# Patient Record
Sex: MALE | Race: WHITE | NOT HISPANIC OR LATINO | URBAN - METROPOLITAN AREA
[De-identification: names, ages, dates, MRNs, and addresses within clinical notes are randomized per-mention and may not be internally consistent; named-entity substitution may affect disease eponyms.]

---

## 2018-01-31 ENCOUNTER — HOSPITAL ENCOUNTER (OUTPATIENT)
Dept: PHYSICAL THERAPY | Facility: HOSPITAL | Age: 38
Setting detail: THERAPIES SERIES
Discharge: HOME | End: 2018-01-31

## 2018-01-31 DIAGNOSIS — M43.16 SPONDYLOLISTHESIS OF LUMBAR REGION: Primary | ICD-10-CM

## 2018-01-31 PROCEDURE — 97162 PT EVAL MOD COMPLEX 30 MIN: CPT

## 2018-01-31 PROCEDURE — 97110 THERAPEUTIC EXERCISES: CPT

## 2018-02-06 ENCOUNTER — HOSPITAL ENCOUNTER (OUTPATIENT)
Dept: PHYSICAL THERAPY | Facility: HOSPITAL | Age: 38
Setting detail: THERAPIES SERIES
Discharge: HOME | End: 2018-02-06

## 2018-02-06 DIAGNOSIS — M43.16 SPONDYLOLISTHESIS OF LUMBAR REGION: Primary | ICD-10-CM

## 2018-02-06 PROCEDURE — 97110 THERAPEUTIC EXERCISES: CPT

## 2018-02-06 NOTE — THERAPY TREATMENT NOTE
Outpatient Physical Therapy Ortho Treatment Note   Mei Hoang     Patient Name: Ray Gagnon  : 1980  MRN: [id number]  Today's Date: 2018      Visit Date: 2018    Visit Dx:    ICD-10-CM ICD-9-CM   1. Spondylolisthesis of lumbar region M43.16 738.4       There is no problem list on file for this patient.       No past medical history on file.     No past surgical history on file.                          PT Assessment/Plan       18 1100       PT Assessment    Assessment Comments Progressed patient with core and trunk strengthening today with minimal complaints of pain and discomfort in his low back. D/C prone hip extensions as he reported increases in low back discomfort.  -AS     PT Plan    PT Plan Comments Continue with current treatment plan.  -AS       User Key  (r) = Recorded By, (t) = Taken By, (c) = Cosigned By    Initials Name Provider Type    AS Nahun Khan, PT Physical Therapist                Modalities       18 1100          Moist Heat    MH Applied Yes  -AS      Location Lumbar  -AS      Rx Minutes 15 mins  -AS      MH Prior to Rx Yes  -AS      ELECTRICAL STIMULATION    Attended/Unattended Unattended  -AS      Stimulation Type IFC  -AS      Location/Electrode Placement/Other Lumbar  -AS      Rx Minutes 15 mins  -AS        User Key  (r) = Recorded By, (t) = Taken By, (c) = Cosigned By    Initials Name Provider Type    AS Nahun Khan, PT Physical Therapist                Exercises       18 1100          Subjective Comments    Subjective Comments Patient states that his low back is feeling better this morning.  -AS      Exercise 1    Exercise Name 1 Blayne. HS Stretch  -AS      Reps 1 10  -AS      Time (Seconds) 1 10 sec hold  -AS      Exercise 2    Exercise Name 2 Blayne. Piriformis Stretch  -AS      Reps 2 10  -AS      Time (Seconds) 2 10 sec hold  -AS      Exercise 3    Exercise Name 3 PPT  -AS      Reps 3 25  -AS      Time (Seconds) 3 5 sec hold  -AS       Exercise 4    Exercise Name 4 Sidelying Hip ABD  -AS      Reps 4 30  -AS      Exercise 5    Exercise Name 5 --  -AS      Reps 5 --  -AS      Exercise 6    Exercise Name 6 Bridge vs Band  -AS      Reps 6 30  -AS      Additional Comments Silver  -AS      Exercise 7    Exercise Name 7 Sidelying Clams  -AS      Reps 7 30  -AS      Additional Comments Silver  -AS      Exercise 8    Exercise Name 8 Seated Hip IR/ER  -AS      Reps 8 20  -AS      Additional Comments Silver  -AS      Exercise 9    Exercise Name 9 Quadruped Alt. UE/LE  -AS      Reps 9 15  -AS        User Key  (r) = Recorded By, (t) = Taken By, (c) = Cosigned By    Initials Name Provider Type    AS Nahun Khan, PT Physical Therapist                                            Time Calculation:   Start Time: 0928  Stop Time: 1021  Time Calculation (min): 53 min    Therapy Charges for Today     Code Description Service Date Service Provider Modifiers Qty    [id number]  PT THER PROC EA 15 MIN 2/6/2018 Nahun Khan, PT GP 2                    Nahun Khan, PT  2/6/2018

## 2018-02-08 ENCOUNTER — HOSPITAL ENCOUNTER (OUTPATIENT)
Dept: PHYSICAL THERAPY | Facility: HOSPITAL | Age: 38
Setting detail: THERAPIES SERIES
Discharge: HOME | End: 2018-02-08

## 2018-02-08 DIAGNOSIS — M43.16 SPONDYLOLISTHESIS OF LUMBAR REGION: Primary | ICD-10-CM

## 2018-02-08 PROCEDURE — 97110 THERAPEUTIC EXERCISES: CPT

## 2018-02-08 NOTE — THERAPY TREATMENT NOTE
Outpatient Physical Therapy Ortho Treatment Note   Mei Hoang     Patient Name: Ray Gagnon  : 1980  MRN: [id number]  Today's Date: 2018      Visit Date: 2018    Visit Dx:    ICD-10-CM ICD-9-CM   1. Spondylolisthesis of lumbar region M43.16 738.4       There is no problem list on file for this patient.       No past medical history on file.     No past surgical history on file.                          PT Assessment/Plan       18 0900       PT Assessment    Assessment Comments Continued to progress patient with core, trunk, and bilateral hip strengthening today without complaints of increased pain or discomfort.  -AS     PT Plan    PT Plan Comments Continue with current treatment plan.  -AS       User Key  (r) = Recorded By, (t) = Taken By, (c) = Cosigned By    Initials Name Provider Type    AS Nahun Khan, PT Physical Therapist                Modalities       18 0900          Moist Heat    MH Applied Yes  -AS      Location Lumbar  -AS      Rx Minutes 15 mins  -AS      MH Prior to Rx Yes  -AS      ELECTRICAL STIMULATION    Attended/Unattended Unattended  -AS      Stimulation Type IFC  -AS      Location/Electrode Placement/Other Lumbar  -AS      Rx Minutes 15 mins  -AS        User Key  (r) = Recorded By, (t) = Taken By, (c) = Cosigned By    Initials Name Provider Type    AS Nahun Khan, PT Physical Therapist                Exercises       18 09          Subjective Comments    Subjective Comments Patient states that he was sore after his last treatment session. He states that he is feeling better this morning.  -AS      Exercise 1    Exercise Name 1 Blayne. HS Stretch  -AS      Reps 1 10  -AS      Time (Seconds) 1 10 sec hold  -AS      Exercise 2    Exercise Name 2 Blayne. Piriformis Stretch  -AS      Reps 2 10  -AS      Time (Seconds) 2 10 sec hold  -AS      Exercise 3    Exercise Name 3 PPT  -AS      Reps 3 25  -AS      Time (Seconds) 3 5 sec hold  -AS       Exercise 4    Exercise Name 4 Sidelying Hip ABD  -AS      Reps 4 Other   40  -AS      Exercise 6    Exercise Name 6 Bridge vs Band  -AS      Reps 6 Other   40  -AS      Additional Comments Silver  -AS      Exercise 7    Exercise Name 7 Sidelying Clams  -AS      Reps 7 Other   40  -AS      Additional Comments Silver  -AS      Exercise 8    Exercise Name 8 Seated Hip IR/ER  -AS      Reps 8 25  -AS      Additional Comments Silver  -AS      Exercise 9    Exercise Name 9 Quadruped Alt. UE/LE  -AS      Reps 9 20  -AS      Exercise 10    Exercise Name 10 Dead Bug  -AS      Reps 10 15  -AS        User Key  (r) = Recorded By, (t) = Taken By, (c) = Cosigned By    Initials Name Provider Type    AS Nahun Khan, PT Physical Therapist                                            Time Calculation:   Start Time: 0937  Stop Time: 1034  Time Calculation (min): 57 min    Therapy Charges for Today     Code Description Service Date Service Provider Modifiers Qty    [id number] HC PT THER PROC EA 15 MIN 2/8/2018 Nahun Khan, PT GP 2                    Nahun Khan, PT  2/8/2018

## 2018-02-13 ENCOUNTER — HOSPITAL ENCOUNTER (OUTPATIENT)
Dept: PHYSICAL THERAPY | Facility: HOSPITAL | Age: 38
Setting detail: THERAPIES SERIES
Discharge: HOME | End: 2018-02-13

## 2018-02-13 DIAGNOSIS — M43.16 SPONDYLOLISTHESIS OF LUMBAR REGION: Primary | ICD-10-CM

## 2018-02-13 PROCEDURE — 97110 THERAPEUTIC EXERCISES: CPT

## 2018-02-13 NOTE — THERAPY TREATMENT NOTE
Outpatient Physical Therapy Ortho Treatment Note  TE [facility]     Patient Name: Ray Gagnon  : 1980  MRN: [id number]  Today's Date: 2018      Visit Date: 2018    Visit Dx:    ICD-10-CM ICD-9-CM   1. Spondylolisthesis of lumbar region M43.16 738.4       There is no problem list on file for this patient.       No past medical history on file.     No past surgical history on file.                          PT Assessment/Plan       18 09       PT Assessment    Assessment Comments Patient continues to report discomfort with soreness in his low back. Continued with strengthening exercises today as tolerated by the patient.  -AS     PT Plan    PT Plan Comments Continue with current treatment plan.  -AS       User Key  (r) = Recorded By, (t) = Taken By, (c) = Cosigned By    Initials Name Provider Type    AS Nahun Khan, PT Physical Therapist                Modalities       18 09          Moist Heat    MH Applied Yes  -AS      Location Lumbar  -AS      Rx Minutes 15 mins  -AS      MH Prior to Rx Yes  -AS      ELECTRICAL STIMULATION    Attended/Unattended Unattended  -AS      Stimulation Type IFC  -AS      Location/Electrode Placement/Other Lumbar  -AS      Rx Minutes 15 mins  -AS        User Key  (r) = Recorded By, (t) = Taken By, (c) = Cosigned By    Initials Name Provider Type    AS Nahun Khan, PT Physical Therapist                Exercises       18 09          Subjective Comments    Subjective Comments Patient states that his back feels about the same. He states that he pretty much gets sore all the time.  -AS      Exercise 1    Exercise Name 1 Blayne. HS Stretch  -AS      Reps 1 10  -AS      Time (Seconds) 1 10 sec hold  -AS      Exercise 2    Exercise Name 2 Blayne. Piriformis Stretch  -AS      Reps 2 10  -AS      Time (Seconds) 2 10 sec hold  -AS      Exercise 3    Exercise Name 3 PPT  -AS      Reps 3 25  -AS      Time (Seconds) 3 5 sec hold  -AS       Exercise 4    Exercise Name 4 Sidelying Hip ABD  -AS      Reps 4 Other   40  -AS      Exercise 6    Exercise Name 6 Bridge vs Band  -AS      Reps 6 25  -AS      Additional Comments Gold  -AS      Exercise 7    Exercise Name 7 Sidelying Clams  -AS      Reps 7 25  -AS      Additional Comments Gold  -AS      Exercise 8    Exercise Name 8 Seated Hip IR/ER  -AS      Reps 8 20  -AS      Additional Comments Gold  -AS      Exercise 9    Exercise Name 9 Quadruped Alt. UE/LE  -AS      Reps 9 20  -AS      Exercise 10    Exercise Name 10 Dead Bug  -AS      Reps 10 20  -AS        User Key  (r) = Recorded By, (t) = Taken By, (c) = Cosigned By    Initials Name Provider Type    AS Nahun Khan, PT Physical Therapist                                            Time Calculation:   Start Time: 0933  Stop Time: 1024  Time Calculation (min): 51 min    Therapy Charges for Today     Code Description Service Date Service Provider Modifiers Qty    [id number]  PT THER PROC EA 15 MIN 2/13/2018 Nahun Khan, PT GP 2                    Nahun Khan, PT  2/13/2018

## 2018-02-15 ENCOUNTER — HOSPITAL ENCOUNTER (OUTPATIENT)
Dept: PHYSICAL THERAPY | Facility: HOSPITAL | Age: 38
Setting detail: THERAPIES SERIES
Discharge: HOME | End: 2018-02-15

## 2018-02-15 DIAGNOSIS — M43.16 SPONDYLOLISTHESIS OF LUMBAR REGION: Primary | ICD-10-CM

## 2018-02-15 PROCEDURE — 97110 THERAPEUTIC EXERCISES: CPT

## 2018-02-15 NOTE — THERAPY TREATMENT NOTE
Outpatient Physical Therapy Ortho Treatment Note   Mei Hoang     Patient Name: Ray Gagnon  : 1980  MRN: [id number]  Today's Date: 2/15/2018      Visit Date: 02/15/2018    Visit Dx:    ICD-10-CM ICD-9-CM   1. Spondylolisthesis of lumbar region M43.16 738.4       There is no problem list on file for this patient.       No past medical history on file.     No past surgical history on file.                          PT Assessment/Plan       02/15/18 1100       PT Assessment    Assessment Comments Decreased intensity of patient's treatment session today as he is reporting increased soreness and discomfort.  -AS     PT Plan    PT Plan Comments Continue with current treatment plan.  -AS       User Key  (r) = Recorded By, (t) = Taken By, (c) = Cosigned By    Initials Name Provider Type    AS Nahun Khan, PT Physical Therapist                Modalities       02/15/18 1100          Moist Heat    MH Applied Yes  -AS      Location Lumbar  -AS      Rx Minutes 15 mins  -AS      MH Prior to Rx Yes  -AS      ELECTRICAL STIMULATION    Attended/Unattended Unattended  -AS      Stimulation Type IFC  -AS      Location/Electrode Placement/Other Lumbar  -AS      Rx Minutes 15 mins  -AS        User Key  (r) = Recorded By, (t) = Taken By, (c) = Cosigned By    Initials Name Provider Type    AS Nahun Khan, PT Physical Therapist                Exercises       02/15/18 1100          Subjective Comments    Subjective Comments Patient states that he is very sore this morning is his low back.  -AS      Exercise 1    Exercise Name 1 Blayne. HS Stretch  -AS      Reps 1 10  -AS      Time (Seconds) 1 10 sec hold  -AS      Exercise 2    Exercise Name 2 Blayne. Piriformis Stretch  -AS      Reps 2 10  -AS      Time (Seconds) 2 10 sec hold  -AS      Exercise 3    Exercise Name 3 PPT  -AS      Reps 3 25  -AS      Time (Seconds) 3 5 sec hold  -AS      Exercise 4    Exercise Name 4 Sidelying Hip ABD  -AS      Reps 4 Other   40   -AS      Exercise 6    Exercise Name 6 Bridge vs Band  -AS      Reps 6 30  -AS      Additional Comments Silver  -AS      Exercise 7    Exercise Name 7 Sidelying Clams  -AS      Reps 7 30  -AS      Additional Comments Silver  -AS      Exercise 8    Exercise Name 8 Seated Hip IR/ER  -AS      Reps 8 20  -AS      Additional Comments Silver  -AS      Exercise 9    Exercise Name 9 Quadruped Alt. UE/LE  -AS      Reps 9 20  -AS      Exercise 10    Exercise Name 10 Dead Bug  -AS      Reps 10 20  -AS        User Key  (r) = Recorded By, (t) = Taken By, (c) = Cosigned By    Initials Name Provider Type    AS Nahun Khan, PT Physical Therapist                                            Time Calculation:   Start Time: 0928  Stop Time: 1017  Time Calculation (min): 49 min    Therapy Charges for Today     Code Description Service Date Service Provider Modifiers Qty    [id number]  PT THER PROC EA 15 MIN 2/15/2018 Nahun Khan, PT GP 2                    Nahun Khan, PT  2/15/2018

## 2025-08-17 ENCOUNTER — APPOINTMENT (OUTPATIENT)
Dept: CT IMAGING | Facility: HOSPITAL | Age: 45
End: 2025-08-17
Payer: COMMERCIAL

## 2025-08-17 ENCOUNTER — HOSPITAL ENCOUNTER (EMERGENCY)
Facility: HOSPITAL | Age: 45
Discharge: HOME | End: 2025-08-17
Attending: STUDENT IN AN ORGANIZED HEALTH CARE EDUCATION/TRAINING PROGRAM | Admitting: STUDENT IN AN ORGANIZED HEALTH CARE EDUCATION/TRAINING PROGRAM
Payer: COMMERCIAL

## 2025-08-17 ENCOUNTER — APPOINTMENT (OUTPATIENT)
Dept: GENERAL RADIOLOGY | Facility: HOSPITAL | Age: 45
End: 2025-08-17
Payer: COMMERCIAL

## 2025-08-17 VITALS
RESPIRATION RATE: 18 BRPM | DIASTOLIC BLOOD PRESSURE: 76 MMHG | BODY MASS INDEX: 19.6 KG/M2 | SYSTOLIC BLOOD PRESSURE: 127 MMHG | HEIGHT: 71 IN | TEMPERATURE: 98.3 F | WEIGHT: 140 LBS | OXYGEN SATURATION: 99 % | HEART RATE: 75 BPM

## 2025-08-17 DIAGNOSIS — S40.012A CONTUSION OF LEFT SHOULDER, INITIAL ENCOUNTER: Primary | ICD-10-CM

## 2025-08-17 DIAGNOSIS — S42.125A CLOSED NONDISPLACED FRACTURE OF ACROMIAL PROCESS OF LEFT SCAPULA, INITIAL ENCOUNTER: ICD-10-CM

## 2025-08-17 PROCEDURE — 70450 CT HEAD/BRAIN W/O DYE: CPT

## 2025-08-17 PROCEDURE — 99284 EMERGENCY DEPT VISIT MOD MDM: CPT | Performed by: STUDENT IN AN ORGANIZED HEALTH CARE EDUCATION/TRAINING PROGRAM

## 2025-08-17 PROCEDURE — 73030 X-RAY EXAM OF SHOULDER: CPT

## 2025-08-17 PROCEDURE — 71045 X-RAY EXAM CHEST 1 VIEW: CPT

## 2025-08-17 RX ORDER — ACETAMINOPHEN 325 MG/1
650 TABLET ORAL EVERY 6 HOURS PRN
Qty: 20 TABLET | Refills: 0 | Status: SHIPPED | OUTPATIENT
Start: 2025-08-17

## 2025-08-17 RX ORDER — ACETAMINOPHEN 500 MG
1000 TABLET ORAL ONCE
Status: COMPLETED | OUTPATIENT
Start: 2025-08-17 | End: 2025-08-17

## 2025-08-17 RX ORDER — OXYCODONE AND ACETAMINOPHEN 5; 325 MG/1; MG/1
1 TABLET ORAL EVERY 6 HOURS PRN
Qty: 12 TABLET | Refills: 0 | Status: SHIPPED | OUTPATIENT
Start: 2025-08-17 | End: 2025-08-20

## 2025-08-17 RX ORDER — IBUPROFEN 600 MG/1
600 TABLET, FILM COATED ORAL EVERY 6 HOURS PRN
Qty: 30 TABLET | Refills: 0 | Status: SHIPPED | OUTPATIENT
Start: 2025-08-17

## 2025-08-17 RX ORDER — CYCLOBENZAPRINE HCL 10 MG
10 TABLET ORAL ONCE
Status: COMPLETED | OUTPATIENT
Start: 2025-08-17 | End: 2025-08-17

## 2025-08-17 RX ADMIN — ACETAMINOPHEN 1000 MG: 500 TABLET, FILM COATED ORAL at 17:55

## 2025-08-17 RX ADMIN — CYCLOBENZAPRINE HYDROCHLORIDE 10 MG: 10 TABLET, FILM COATED ORAL at 17:55

## 2025-08-19 ENCOUNTER — OFFICE VISIT (OUTPATIENT)
Dept: ORTHOPEDIC SURGERY | Facility: CLINIC | Age: 45
End: 2025-08-19
Payer: COMMERCIAL

## 2025-08-19 VITALS
SYSTOLIC BLOOD PRESSURE: 104 MMHG | HEIGHT: 71 IN | BODY MASS INDEX: 19.6 KG/M2 | DIASTOLIC BLOOD PRESSURE: 65 MMHG | HEART RATE: 78 BPM | WEIGHT: 140 LBS

## 2025-08-19 DIAGNOSIS — M25.512 LEFT SHOULDER PAIN, UNSPECIFIED CHRONICITY: ICD-10-CM

## 2025-08-19 DIAGNOSIS — S42.125A CLOSED NONDISPLACED FRACTURE OF ACROMIAL PROCESS OF LEFT SCAPULA, INITIAL ENCOUNTER: ICD-10-CM

## 2025-08-19 DIAGNOSIS — M75.82 ROTATOR CUFF TENDINITIS, LEFT: Primary | ICD-10-CM

## 2025-08-19 DIAGNOSIS — Z86.59 HISTORY OF CLAUSTROPHOBIA: ICD-10-CM

## 2025-08-19 RX ORDER — DIAZEPAM 5 MG/1
TABLET ORAL
Qty: 2 TABLET | Refills: 0 | Status: SHIPPED | OUTPATIENT
Start: 2025-08-19

## 2025-08-21 ENCOUNTER — TELEPHONE (OUTPATIENT)
Dept: ORTHOPEDIC SURGERY | Facility: CLINIC | Age: 45
End: 2025-08-21
Payer: COMMERCIAL

## 2025-08-22 ENCOUNTER — PATIENT ROUNDING (BHMG ONLY) (OUTPATIENT)
Dept: ORTHOPEDIC SURGERY | Facility: CLINIC | Age: 45
End: 2025-08-22
Payer: COMMERCIAL

## 2025-08-27 ENCOUNTER — HOSPITAL ENCOUNTER (OUTPATIENT)
Dept: MRI IMAGING | Facility: HOSPITAL | Age: 45
Discharge: HOME | End: 2025-08-27
Admitting: NURSE PRACTITIONER
Payer: COMMERCIAL

## 2025-08-27 DIAGNOSIS — S42.125A CLOSED NONDISPLACED FRACTURE OF ACROMIAL PROCESS OF LEFT SCAPULA, INITIAL ENCOUNTER: ICD-10-CM

## 2025-08-27 DIAGNOSIS — M75.82 ROTATOR CUFF TENDINITIS, LEFT: ICD-10-CM

## 2025-08-27 PROCEDURE — 73221 MRI JOINT UPR EXTREM W/O DYE: CPT
